# Patient Record
(demographics unavailable — no encounter records)

---

## 2025-07-15 NOTE — DEVELOPMENTAL MILESTONES
[Laughs with others] : laughs with others [Points to pictures] : points to pictures [Brushes teeth with help] : does not brush teeth with help [Feeds doll] : does not feed doll [Removes garments] : does not remove garments [Uses spoon/fork] : does not use spoon/fork [Scribbles] : does not scribble [Drinks from cup without spilling] : does not drink from cup without spilling [Speech half understandable] : speech is not half understandable [Combines words] : does not combine words [Understands 2 step commands] : does not understand  2 step commands [Says 5-10 words] : does not say 5-10 words [Says >10 words] : does not say  >10 words [Throws ball overhead] : does not throw ball overhead [Kicks ball forward] : does not kick ball forward [Walks up steps] : does not walk up steps [Runs] : does not run [FreeTextEntry3] : Es, abbya, daniel, and Emily, just started crawling [Imaginative play] : imaginative play [Feeds self with help] : does not feed self with help [Dresses self with help] : does not dress self with help [Puts on T-shirt] : does not put on t-shirt [Wash and dry hand] : does not wash and dry hand [Brushes teeth, no help] : does not brush  teeth no help [Day toilet trained for bowel and bladder] : no day toilet training for bowel and bladder. [Names friend] : does not name  friend [Copies [place]] : does not copy [place] [2-3 sentences] : no 2-3 sentences [Understandable speech 75% of time] : speech not understandable 75% of the time [Identifies self as girl/boy] : does not identify self as girl/boy [Knows 2 adjectives] : does not know 2 adjectives [Names a friend] : does not name a friend [Walks up stairs alternating feet] : does not walk up stairs alternating feet [Balances on each foot 3 seconds] : does not balance on each foot 3 seconds

## 2025-07-15 NOTE — REASON FOR VISIT
[Initial Consultation] : an initial consultation for [Mother] : mother [Hospital Follow-Up] : a hospital follow-up for [FreeTextEntry2] : Abnormal Gait

## 2025-07-15 NOTE — HISTORY OF PRESENT ILLNESS
[FreeTextEntry1] : Adan is an ex 29 weeker, 32 month-old boy being seen for a hospital follow up visit. Patient last seen in our office May 2024 for developmental delay and staring spells.   Interval history: Patient was seen in the [facility] [place] for gait change on . At that time he had two days of a change in baseline gait per mother. Adan typically walks on his toes, but is stable and does not fall over--however on presentation he was falling over and not bearing much weight on his left leg. Mother also reports that Adan would prefer to hold his left leg externally rotated. Patient continues to receive PT and OT--his therapists evaluated him and told mother to bring Adan to the ED for further evaluation. In the ED, Xray of the left lower extremity was obtained which ruled out fracture and hip/knee dislocation. Neurology inpatient team was consulted, however patient's gait was improving by the time of neurology team exam so plan was to follow up outpatient.   Since discharge from ED, mother reports that Adan's gait has somewhat improved but she still reports that it is not at baseline. Adan has otherwise been acting appropriately with no behavior changes. Mother states it is difficult to assess whether he is in pain when walking or favoring one side over the other since he is nonverbal, but she denies any increased irritability or fussiness. Mother denies any recent trauma.   Birth Hx : Adan was born 29 weeks twin gestation via . Birth complicated by placenta previa and maternal hemorrhage. He spent 3 months in the NICU for prematurity and for CPAP/feeding issues. He started rolling at 10 months, crawling at 12 months and pull to stand at 17 months, since then has been able to walk independently but prefers to walk on tiptoes.

## 2025-07-15 NOTE — CONSULT LETTER
[Dear  ___] : Dear  [unfilled], [Consult Letter:] : I had the pleasure of evaluating your patient, [unfilled]. [Please see my note below.] : Please see my note below. [Consult Closing:] : Thank you very much for allowing me to participate in the care of this patient.  If you have any questions, please do not hesitate to contact me. [Sincerely,] : Sincerely, [FreeTextEntry3] : Constantine Kaba Caro, DO PGY-5, Child Neurology [facility] and [facility] [address], [address] [place] [address]    Dr. Kathy Thomson M.D. Child Neurology Attending Physician [facility] [address], [place] [address]

## 2025-07-15 NOTE — PHYSICAL EXAM
[Well-appearing] : well-appearing [Normocephalic] : normocephalic [No dysmorphic facial features] : no dysmorphic facial features [No ocular abnormalities] : no ocular abnormalities [Neck supple] : neck supple [Soft] : soft [No abnormal neurocutaneous stigmata or skin lesions] : no abnormal neurocutaneous stigmata or skin lesions [No deformities] : no deformities [Alert] : alert [Well related, good eye contact] : well related, good eye contact [Single words] : single words [Pupils reactive to light] : pupils reactive to light [Turns to light] : turns to light [Tracks face, light or objects with full extraocular movements] : tracks face, light or objects with full extraocular movements [Responds to touch on face] : responds to touch on face [No facial asymmetry or weakness] : no facial asymmetry or weakness [No nystagmus] : no nystagmus [Responds to voice/sounds] : responds to voice/sounds [Midline tongue] : midline tongue [Reaches for toys and or gives high five] : reaches for toys and or gives high five [Good  bilaterally] : good  bilaterally [5/5 strength in proximal and distal muscles of arms and legs] : 5/5 strength in proximal and distal muscles of arms and legs [No abnormal involuntary movements] : no abnormal involuntary movements [Stands holding on] : stands holding on [2+ biceps] : 2+ biceps [No ankle clonus] : no ankle clonus [Bilaterally] : bilaterally [Responds to touch and tickle] : responds to touch and tickle [No dysmetria in reaching for objects and or on FTNT] : no dysmetria in reaching for objects and or on FTNT [Straight] : straight [Normal axial and appendicular muscle tone with symmetric limb movements] : normal axial and appendicular muscle tone with symmetric limb movements [Knee jerks] : knee jerks [Stands alone] : stands alone [Good stoop and recover] : good stoop and recover [Able to walk on toes] : able to walk on toes [de-identified] : Asymmetric posterior gluteal folds [de-identified] : Poor eye contact [de-identified] : Babbles, only occasionally says a discernible word [de-identified] : Able to walk independently on toes, appears to prefer keeping left lower extremity externally rotated, no discomfort with weight bearing [de-identified] : At times sways over while walking

## 2025-07-15 NOTE — DEVELOPMENTAL MILESTONES
[Laughs with others] : laughs with others [Points to pictures] : points to pictures [Brushes teeth with help] : does not brush teeth with help [Feeds doll] : does not feed doll [Removes garments] : does not remove garments [Uses spoon/fork] : does not use spoon/fork [Scribbles] : does not scribble [Drinks from cup without spilling] : does not drink from cup without spilling [Speech half understandable] : speech is not half understandable [Combines words] : does not combine words [Understands 2 step commands] : does not understand  2 step commands [Says 5-10 words] : does not say 5-10 words [Says >10 words] : does not say  >10 words [Throws ball overhead] : does not throw ball overhead [Kicks ball forward] : does not kick ball forward [Walks up steps] : does not walk up steps [Runs] : does not run [FreeTextEntry3] : Es, abbya, daniel, and Emily, just started crawling [Imaginative play] : imaginative play [Feeds self with help] : does not feed self with help [Dresses self with help] : does not dress self with help [Puts on T-shirt] : does not put on t-shirt [Wash and dry hand] : does not wash and dry hand [Brushes teeth, no help] : does not brush  teeth no help [Day toilet trained for bowel and bladder] : no day toilet training for bowel and bladder. [Names friend] : does not name  friend [Copies [place]] : does not copy [place] [2-3 sentences] : no 2-3 sentences [Understandable speech 75% of time] : speech not understandable 75% of the time [Identifies self as girl/boy] : does not identify self as girl/boy [Knows 2 adjectives] : does not know 2 adjectives [Names a friend] : does not name a friend [Walks up stairs alternating feet] : does not walk up stairs alternating feet [Balances on each foot 3 seconds] : does not balance on each foot 3 seconds

## 2025-07-15 NOTE — DEVELOPMENTAL MILESTONES
[Laughs with others] : laughs with others [Points to pictures] : points to pictures [Brushes teeth with help] : does not brush teeth with help [Feeds doll] : does not feed doll [Removes garments] : does not remove garments [Uses spoon/fork] : does not use spoon/fork [Scribbles] : does not scribble [Drinks from cup without spilling] : does not drink from cup without spilling [Speech half understandable] : speech is not half understandable [Combines words] : does not combine words [Understands 2 step commands] : does not understand  2 step commands [Says 5-10 words] : does not say 5-10 words [Says >10 words] : does not say  >10 words [Throws ball overhead] : does not throw ball overhead [Kicks ball forward] : does not kick ball forward [Walks up steps] : does not walk up steps [Runs] : does not run [FreeTextEntry3] : Es, abbya, daniel, and Emily, just started crawling [Imaginative play] : imaginative play [Feeds self with help] : does not feed self with help [Dresses self with help] : does not dress self with help [Puts on T-shirt] : does not put on t-shirt [Wash and dry hand] : does not wash and dry hand [Brushes teeth, no help] : does not brush  teeth no help [Day toilet trained for bowel and bladder] : no day toilet training for bowel and bladder. [Names friend] : does not name  friend [Copies [place]] : does not copy [place] [2-3 sentences] : no 2-3 sentences [Understandable speech 75% of time] : speech not understandable 75% of the time [Identifies self as girl/boy] : does not identify self as girl/boy [Knows 2 adjectives] : does not know 2 adjectives [Names a friend] : does not name a friend [Walks up stairs alternating feet] : does not walk up stairs alternating feet [Balances on each foot 3 seconds] : does not balance on each foot 3 seconds

## 2025-07-15 NOTE — PHYSICAL EXAM
[Well-appearing] : well-appearing [Normocephalic] : normocephalic [No dysmorphic facial features] : no dysmorphic facial features [No ocular abnormalities] : no ocular abnormalities [Neck supple] : neck supple [Soft] : soft [No abnormal neurocutaneous stigmata or skin lesions] : no abnormal neurocutaneous stigmata or skin lesions [No deformities] : no deformities [Alert] : alert [Well related, good eye contact] : well related, good eye contact [Single words] : single words [Pupils reactive to light] : pupils reactive to light [Turns to light] : turns to light [Tracks face, light or objects with full extraocular movements] : tracks face, light or objects with full extraocular movements [Responds to touch on face] : responds to touch on face [No facial asymmetry or weakness] : no facial asymmetry or weakness [No nystagmus] : no nystagmus [Responds to voice/sounds] : responds to voice/sounds [Midline tongue] : midline tongue [Reaches for toys and or gives high five] : reaches for toys and or gives high five [Good  bilaterally] : good  bilaterally [5/5 strength in proximal and distal muscles of arms and legs] : 5/5 strength in proximal and distal muscles of arms and legs [No abnormal involuntary movements] : no abnormal involuntary movements [Stands holding on] : stands holding on [2+ biceps] : 2+ biceps [No ankle clonus] : no ankle clonus [Bilaterally] : bilaterally [Responds to touch and tickle] : responds to touch and tickle [No dysmetria in reaching for objects and or on FTNT] : no dysmetria in reaching for objects and or on FTNT [Straight] : straight [Normal axial and appendicular muscle tone with symmetric limb movements] : normal axial and appendicular muscle tone with symmetric limb movements [Knee jerks] : knee jerks [Stands alone] : stands alone [Good stoop and recover] : good stoop and recover [Able to walk on toes] : able to walk on toes [de-identified] : Asymmetric posterior gluteal folds [de-identified] : Poor eye contact [de-identified] : Babbles, only occasionally says a discernible word [de-identified] : Able to walk independently on toes, appears to prefer keeping left lower extremity externally rotated, no discomfort with weight bearing [de-identified] : At times sways over while walking

## 2025-07-15 NOTE — CONSULT LETTER
[Dear  ___] : Dear  [unfilled], [Consult Letter:] : I had the pleasure of evaluating your patient, [unfilled]. [Please see my note below.] : Please see my note below. [Consult Closing:] : Thank you very much for allowing me to participate in the care of this patient.  If you have any questions, please do not hesitate to contact me. [Sincerely,] : Sincerely, [FreeTextEntry3] : Constantine Kaba Caro, DO PGY-5, Child Neurology [facility] and [facility] [address], [address] [place] [address]    Dr. Kathy Thomson M.D. Child Neurology Attending Physician [facility] [address], [place] [address]

## 2025-07-15 NOTE — PHYSICAL EXAM
[Well-appearing] : well-appearing [Normocephalic] : normocephalic [No dysmorphic facial features] : no dysmorphic facial features [No ocular abnormalities] : no ocular abnormalities [Neck supple] : neck supple [Soft] : soft [No abnormal neurocutaneous stigmata or skin lesions] : no abnormal neurocutaneous stigmata or skin lesions [No deformities] : no deformities [Alert] : alert [Well related, good eye contact] : well related, good eye contact [Single words] : single words [Pupils reactive to light] : pupils reactive to light [Turns to light] : turns to light [Tracks face, light or objects with full extraocular movements] : tracks face, light or objects with full extraocular movements [Responds to touch on face] : responds to touch on face [No facial asymmetry or weakness] : no facial asymmetry or weakness [No nystagmus] : no nystagmus [Responds to voice/sounds] : responds to voice/sounds [Midline tongue] : midline tongue [Reaches for toys and or gives high five] : reaches for toys and or gives high five [Good  bilaterally] : good  bilaterally [5/5 strength in proximal and distal muscles of arms and legs] : 5/5 strength in proximal and distal muscles of arms and legs [No abnormal involuntary movements] : no abnormal involuntary movements [Stands holding on] : stands holding on [2+ biceps] : 2+ biceps [No ankle clonus] : no ankle clonus [Bilaterally] : bilaterally [Responds to touch and tickle] : responds to touch and tickle [No dysmetria in reaching for objects and or on FTNT] : no dysmetria in reaching for objects and or on FTNT [Straight] : straight [Normal axial and appendicular muscle tone with symmetric limb movements] : normal axial and appendicular muscle tone with symmetric limb movements [Knee jerks] : knee jerks [Stands alone] : stands alone [Good stoop and recover] : good stoop and recover [Able to walk on toes] : able to walk on toes [de-identified] : Asymmetric posterior gluteal folds [de-identified] : Poor eye contact [de-identified] : Babbles, only occasionally says a discernible word [de-identified] : Able to walk independently on toes, appears to prefer keeping left lower extremity externally rotated, no discomfort with weight bearing [de-identified] : At times sways over while walking

## 2025-07-15 NOTE — CONSULT LETTER
[Dear  ___] : Dear  [unfilled], [Consult Letter:] : I had the pleasure of evaluating your patient, [unfilled]. [Please see my note below.] : Please see my note below. [Consult Closing:] : Thank you very much for allowing me to participate in the care of this patient.  If you have any questions, please do not hesitate to contact me. [Sincerely,] : Sincerely, [FreeTextEntry3] : Constantine Kaba Caro, DO PGY-5, Child Neurology [facility] and [facility] [address], [address] [place] [address]    Dr. Kathy Thomson M.D. Child Neurology Attending Physician [facility] [address], [place] [address]

## 2025-07-15 NOTE — HISTORY OF PRESENT ILLNESS
[FreeTextEntry1] : Adan is an ex 29 weeker, 32 month-old boy being seen for a hospital follow up visit. Patient last seen in our office May 2024 for developmental delay and staring spells.   Interval history: Patient was seen in the [facility] ED for gait change on . At that time he had two days of a change in baseline gait per mother. Adan typically walks on his toes, but is stable and does not fall over--however on presentation he was falling over and not bearing much weight on his left leg. Mother also reports that Adan would prefer to hold his left leg externally rotated. Patient continues to receive PT and OT--his therapists evaluated him and told mother to bring Adan to the ED for further evaluation. In the ED, Xray of the left lower extremity was obtained which ruled out fracture and hip/knee dislocation. Neurology inpatient team was consulted, however patient's gait was improving by the time of neurology team exam so plan was to follow up outpatient.   Since discharge from ED, mother reports that Adan's gait has somewhat improved but she still reports that it is not at baseline. Adan has otherwise been acting appropriately with no behavior changes. Mother states it is difficult to assess whether he is in pain when walking or favoring one side over the other since he is nonverbal, but she denies any increased irritability or fussiness. Mother denies any recent trauma.   Birth Hx : Adan was born 29 weeks twin gestation via . Birth complicated by placenta previa and maternal hemorrhage. He spent 3 months in the NICU for prematurity and for CPAP/feeding issues. He started rolling at 10 months, crawling at 12 months and pull to stand at 17 months, since then has been able to walk independently but prefers to walk on tiptoes.

## 2025-07-15 NOTE — HISTORY OF PRESENT ILLNESS
[FreeTextEntry1] : Adan is an ex 29 weeker, 32 month-old boy being seen for a hospital follow up visit. Patient last seen in our office May 2024 for developmental delay and staring spells.   Interval history: [facility] for gait change on . At that time he had two days of a change in baseline gait per mother. Adan typically walks on his toes, but is stable and does not fall over--however on presentation he was falling over and not bearing much weight on his left leg. Mother also reports that Adan would prefer to hold his left leg externally rotated. Patient continues to receive PT and OT--his therapists evaluated him and told mother to bring Adan to the ED for further evaluation. In the ED, Xray of the left lower extremity was obtained which ruled out fracture and hip/knee dislocation. Neurology inpatient team was consulted, however patient's gait was improving by the time of neurology team exam so plan was to follow up outpatient.   Since discharge from ED, mother reports that Adan's gait has somewhat improved but she still reports that it is not at baseline. Adan has otherwise been acting appropriately with no behavior changes. Mother states it is difficult to assess whether he is in pain when walking or favoring one side over the other since he is nonverbal, but she denies any increased irritability or fussiness. Mother denies any recent trauma.   Birth Hx : Adan was born 29 weeks twin gestation via . Birth complicated by placenta previa and maternal hemorrhage. He spent 3 months in the NICU for prematurity and for CPAP/feeding issues. He started rolling at 10 months, crawling at 12 months and pull to stand at 17 months, since then has been able to walk independently but prefers to walk on tiptoes.

## 2025-07-15 NOTE — BIRTH HISTORY
[At ___ Weeks Gestation] : at [unfilled] weeks gestation [ Section] : by  section [Multiple Gestation] : multiple gestation [Speech & Motor Delay] : patient has speech and motor delay  [Physical Therapy] : physical therapy [Occupational Therapy] : occupational therapy [Speech Therapy] : speech therapy [Age Appropriate] : age appropriate developmental milestones not met [de-identified] : Hemorrhage  [FreeTextEntry5] : Early intervention

## 2025-07-15 NOTE — PLAN
[FreeTextEntry1] : - Send serum B12, folate, TSH w/ T4, ESR, CRP, CBC, CMP, CK - Obtain MRI head and lumbar spine w/ and w/o contrast - Refer to pediatric orthopedic surgery - Follow up in 1 month after MRIs are completed for reassessment

## 2025-07-15 NOTE — ASSESSMENT
[FreeTextEntry1] : Adan is an ex 29 weeker twin gestation, 32 month-old boy seen today for concerns regarding an abnormal gait as a hospital follow up visit. Patient is able to walk independently but has preference towards holding his left lower extremity externally rotated. Overall exam reassuring without UMN or LMN concerning findings, however given the persistence of this gait change we will pursue further workup with head and lumbar spine MRI along with serum labs to rule out other possible etiologies of gait change. Will also refer patient to orthopedic surgery for evaluation

## 2025-07-15 NOTE — BIRTH HISTORY
[At ___ Weeks Gestation] : at [unfilled] weeks gestation [ Section] : by  section [Multiple Gestation] : multiple gestation [Speech & Motor Delay] : patient has speech and motor delay  [Physical Therapy] : physical therapy [Occupational Therapy] : occupational therapy [Speech Therapy] : speech therapy [Age Appropriate] : age appropriate developmental milestones not met [de-identified] : Hemorrhage  [FreeTextEntry5] : Early intervention

## 2025-07-15 NOTE — BIRTH HISTORY
[At ___ Weeks Gestation] : at [unfilled] weeks gestation [ Section] : by  section [Multiple Gestation] : multiple gestation [Speech & Motor Delay] : patient has speech and motor delay  [Physical Therapy] : physical therapy [Occupational Therapy] : occupational therapy [Speech Therapy] : speech therapy [Age Appropriate] : age appropriate developmental milestones not met [de-identified] : Hemorrhage  [FreeTextEntry5] : Early intervention

## 2025-07-30 NOTE — ASSESSMENT
[FreeTextEntry1] : 2-year-old male with abnormal gait and toe walking  -We discussed the history, physical exam, and all available radiographs at length during today's visit with patient and his parent/guardian who served as an independent historian due to child's age and unreliable nature of history. -Documentation from [facility] was reviewed today -Documentation from outpatient neurology was reviewed today - AP lateral radiographs of bilateral hips were obtained and independently reviewed during today's visit.  Bilateral femoral heads are well-seated within the acetabuli.  Bilateral Shenton's line is intact.  Bilateral femoral ossification centers are present and symmetric.  Bilateral acetabular indices are normal for age.  - Left lower extremity radiographs were obtained at [facility] on 6/16/2025 and reviewed today: No fracture, dislocation or bony injury.  - Bilateral AP of the hips and femurs were obtained at [facility] on 7/17/2025 and reviewed today: No fracture, dislocation or bony injury.  -Clinically, his examination is significantly limited due to compliance. He is seen bearing equal weight on the lower extremities with an alternating out toeing gait and toe walking.  -The differential diagnosis of abnormal/toe walking gait in children was discussed at length today. Differential diagnosis includes transient synovitis following his polio vaccine, Perthes disease not yet visible on radiographs and septic arthritis, though this is of low suspicion based on normal lab results and ability to bear weight.  -As he is already getting an MRI of the brain and lumbar spine a pelvis MRI was ordered today for evaluate for perthes disease. The study was ordered today. Authorization will be obtained, and the family will be contact in regard to scheduling of the imaging.  -Based on imaging obtained today there is no concern for hip dysplasia. -At this time the family should limit high impact activities.  -We will plan to see him back in clinic after the MRI is obtained to review the findings.  Further treatment pending results of the MRI.  It was discussed if there is progression of his abnormal gait or inability to bear weight the family should present to [facility] for emergent evaluation.

## 2025-07-30 NOTE — HISTORY OF PRESENT ILLNESS
[FreeTextEntry1] : Adan is a 2-year-old male with abnormal gait.  Per report he received his polio vaccine in early June.  On 6/16/2025 his mother noted a change in his gait.  He presented to [facility] on 6/18/2025 where a neuro evaluation was performed.  Left lower extremity radiographs were also performed at that time and were negative.  Outpatient neurology was recommended.  He was then seen at outpatient neurology where an MRI of the head and lumbar spine were ordered.  Due to the need for sedation the earliest appointment he was able to obtain was September 2025.  His mother then represented to [facility] on 7/17/2025 to further evaluate the cause of his abnormal gait.  Bilateral pelvis and femur radiographs were obtained at that time and were negative.  It was recommended he follow-up with pediatric orthopedics outpatient.  Today, his mother reports he continues to walk with an abnormal gait.  She reports it has improved over the last month.  He is not able to run and play.  He has noted to walk on his toes most of the day.  She feels he needs to rest more often than his brother and feels this may be related to discomfort.  He presents today for evaluation of his abnormal gait.

## 2025-07-30 NOTE — HISTORY OF PRESENT ILLNESS
[FreeTextEntry1] : Adan is a 2-year-old male with abnormal gait.  Per report he received his polio vaccine in early June.  On 6/16/2025 his mother noted a change in his gait.  He presented to [facility] on 6/18/2025 where a neuro evaluation was performed.  Left lower extremity radiographs were also performed at that time and were negative.  Outpatient neurology was recommended.  He was then seen at outpatient neurology where an MRI of the head and lumbar spine were ordered.  Due to the need for sedation the earliest appointment he was able to obtain was September 2025.  His mother then represented to [facility] on 7/17/2025 to further evaluate the cause of his abnormal gait.  Bilateral pelvis and femur radiographs were obtained at that time and were negative.  It was recommended he follow-up with pediatric orthopedics outpatient.  Today, his mother reports he continues to walk with an abnormal gait.  She reports it has improved over the last month.  He is not able to run and play.  He has noted to walk on his toes most of the day.  She feels he needs to rest more often than his brother and feels this may be related to discomfort.  He presents today for evaluation of his abnormal gait.

## 2025-07-30 NOTE — PHYSICAL EXAM
[FreeTextEntry1] : GENERAL: alert, cooperative, in NAD SKIN: The skin is intact, warm, pink and dry over the area examined. EYES: Normal conjunctiva, normal eyelids and pupils were equal and round. ENT: normal ears, normal nose and normal lips. CARDIOVASCULAR: brisk capillary refill, but no peripheral edema. RESPIRATORY: The patient is in no apparent respiratory distress. They're taking full deep breaths without use of accessory muscles or evidence of audible wheezes or stridor without the use of a stethoscope. Normal respiratory effort. ABDOMEN: not examined.  Bilateral lower extremities: **Examination significantly limited due to patient compliance** Patient cried throughout exam Actively kicking both legs No obvious bruising or swelling  Patient is seen ambulating alternating between a 10 degree external foot progression angle and on his toes. No limp noted.

## 2025-07-30 NOTE — DATA REVIEWED
[de-identified] : AP lateral radiographs of bilateral hips were obtained and independently reviewed during today's visit.  Bilateral femoral heads are well-seated within the acetabuli.  Bilateral Shenton's line is intact.  Bilateral femoral ossification centers are present and symmetric.  Bilateral acetabular indices are normal for age. No evidence of perthes or AVN.  Left lower extremity radiographs were obtained at [facility] on 6/16/2025 and reviewed today: No fracture, dislocation or bony injury.   Bilateral AP of the hips and femurs were obtained at [facility] on 7/17/2025 and reviewed today: No fracture, dislocation or bony injury.

## 2025-07-30 NOTE — DATA REVIEWED
[de-identified] : AP lateral radiographs of bilateral hips were obtained and independently reviewed during today's visit.  Bilateral femoral heads are well-seated within the acetabuli.  Bilateral Shenton's line is intact.  Bilateral femoral ossification centers are present and symmetric.  Bilateral acetabular indices are normal for age. No evidence of perthes or AVN.  Left lower extremity radiographs were obtained at [facility] on 6/16/2025 and reviewed today: No fracture, dislocation or bony injury.   Bilateral AP of the hips and femurs were obtained at [facility] on 7/17/2025 and reviewed today: No fracture, dislocation or bony injury.

## 2025-07-30 NOTE — BIRTH HISTORY
[Duration: ___ wks] : duration: [unfilled] weeks [] :  [Was child in NICU?] : Child was in NICU [FreeTextEntry7] : 4 months

## 2025-07-30 NOTE — DEVELOPMENTAL MILESTONES
[See scanned document for history] : See scanned document for history [Walk ___ Months] : Walk: [unfilled] months [Right] : right [FreeTextEntry2] : PT, OT, Speech

## 2025-07-30 NOTE — ASSESSMENT
[FreeTextEntry1] : 2-year-old male with abnormal gait and toe walking  -We discussed the history, physical exam, and all available radiographs at length during today's visit with patient and his parent/guardian who served as an independent historian due to child's age and unreliable nature of history. -Documentation from [facility] [place] was [place] today -Documentation from outpatient neurology was reviewed today - AP lateral radiographs of bilateral hips were obtained and independently reviewed during today's visit.  Bilateral femoral heads are well-seated within the acetabuli.  Bilateral Shenton's line is intact.  Bilateral femoral ossification centers are present and symmetric.  Bilateral acetabular indices are normal for age.  - Left lower extremity radiographs were obtained at [facility] on 6/16/2025 and reviewed today: No fracture, dislocation or bony injury.  - Bilateral AP of the hips and femurs were obtained at [facility] on 7/17/2025 and reviewed today: No fracture, dislocation or bony injury.  -Clinically, his examination is significantly limited due to compliance. He is seen bearing equal weight on the lower extremities with an alternating out toeing gait and toe walking.  -The differential diagnosis of abnormal/toe walking gait in children was discussed at length today. Differential diagnosis includes transient synovitis following his polio vaccine, Perthes disease not yet visible on radiographs and septic arthritis, though this is of low suspicion based on normal lab results and ability to bear weight.  -As he is already getting an MRI of the brain and lumbar spine a pelvis MRI was ordered today for evaluate for perthes disease. The study was ordered today. Authorization will be obtained, and the family will be contact in regard to scheduling of the imaging.  -Based on imaging obtained today there is no concern for hip dysplasia. -At this time the family should limit high impact activities.  -We will plan to see him back in clinic after the MRI is obtained to review the findings.  Further treatment pending results of the MRI.  It was discussed if there is progression of his abnormal gait or inability to bear weight the family should present to [facility] for emergent evaluation.

## 2025-07-30 NOTE — REASON FOR VISIT
[Initial Evaluation] : an initial evaluation [Patient] : patient [Parents] : parents [FreeTextEntry1] : abnormal gait

## 2025-07-30 NOTE — REVIEW OF SYSTEMS
[Change in Activity] : no change in activity [Redness] : no redness [Sore Throat] : no sore throat [Vomiting] : no vomiting [Bladder Infection] : no bladder infection [Joint Pains] : no arthralgias